# Patient Record
Sex: FEMALE | Employment: STUDENT | URBAN - METROPOLITAN AREA
[De-identification: names, ages, dates, MRNs, and addresses within clinical notes are randomized per-mention and may not be internally consistent; named-entity substitution may affect disease eponyms.]

---

## 2023-02-08 PROBLEM — F41.9 ANXIETY: Status: ACTIVE | Noted: 2023-02-08

## 2023-02-08 PROBLEM — N92.6 IRREGULAR MENSES: Status: ACTIVE | Noted: 2023-02-08

## 2023-02-08 PROBLEM — G80.9 CEREBRAL PALSY (MULTI): Status: ACTIVE | Noted: 2023-02-08

## 2023-02-08 PROBLEM — R73.09 ELEVATED GLUCOSE LEVEL: Status: RESOLVED | Noted: 2023-02-08 | Resolved: 2023-02-08

## 2023-02-08 RX ORDER — ESCITALOPRAM OXALATE 5 MG/1
1 TABLET ORAL DAILY
COMMUNITY
Start: 2022-10-31 | End: 2023-08-11 | Stop reason: DRUGHIGH

## 2023-02-08 RX ORDER — NORGESTIMATE AND ETHINYL ESTRADIOL 0.25-0.035
1 KIT ORAL DAILY
COMMUNITY
Start: 2022-10-12 | End: 2023-12-01 | Stop reason: SDUPTHER

## 2023-08-11 ENCOUNTER — OFFICE VISIT (OUTPATIENT)
Dept: PRIMARY CARE | Facility: CLINIC | Age: 18
End: 2023-08-11
Payer: COMMERCIAL

## 2023-08-11 VITALS
BODY MASS INDEX: 34.55 KG/M2 | DIASTOLIC BLOOD PRESSURE: 68 MMHG | SYSTOLIC BLOOD PRESSURE: 110 MMHG | TEMPERATURE: 98.4 F | HEIGHT: 61 IN | WEIGHT: 183 LBS | HEART RATE: 76 BPM | RESPIRATION RATE: 20 BRPM

## 2023-08-11 DIAGNOSIS — G80.9 CEREBRAL PALSY, UNSPECIFIED TYPE (MULTI): ICD-10-CM

## 2023-08-11 DIAGNOSIS — Z13.31 SCREENING FOR DEPRESSION: ICD-10-CM

## 2023-08-11 DIAGNOSIS — F41.9 ANXIETY: Primary | ICD-10-CM

## 2023-08-11 DIAGNOSIS — E66.9 OBESITY (BMI 30.0-34.9): ICD-10-CM

## 2023-08-11 PROBLEM — E66.811 OBESITY (BMI 30.0-34.9): Status: ACTIVE | Noted: 2023-08-11

## 2023-08-11 PROCEDURE — 99214 OFFICE O/P EST MOD 30 MIN: CPT | Performed by: FAMILY MEDICINE

## 2023-08-11 PROCEDURE — 96127 BRIEF EMOTIONAL/BEHAV ASSMT: CPT | Performed by: FAMILY MEDICINE

## 2023-08-11 RX ORDER — AMITRIPTYLINE HYDROCHLORIDE 10 MG/1
10 TABLET, FILM COATED ORAL NIGHTLY
Qty: 30 TABLET | Refills: 11 | Status: SHIPPED | OUTPATIENT
Start: 2023-08-11 | End: 2023-09-05

## 2023-08-11 RX ORDER — ESCITALOPRAM OXALATE 10 MG/1
10 TABLET ORAL DAILY
Qty: 30 TABLET | Refills: 3 | Status: SHIPPED | OUTPATIENT
Start: 2023-08-11 | End: 2024-01-16

## 2023-08-11 ASSESSMENT — ENCOUNTER SYMPTOMS
SLEEP DISTURBANCE: 0
NERVOUS/ANXIOUS: 1
IRRITABILITY: 1
DYSPHORIC MOOD: 0

## 2023-08-11 ASSESSMENT — PATIENT HEALTH QUESTIONNAIRE - PHQ9
1. LITTLE INTEREST OR PLEASURE IN DOING THINGS: NOT AT ALL
SUM OF ALL RESPONSES TO PHQ9 QUESTIONS 1 AND 2: 0
2. FEELING DOWN, DEPRESSED OR HOPELESS: NOT AT ALL

## 2023-08-11 NOTE — PROGRESS NOTES
"Subjective   Patient ID: Pradip Alcocer is a 17 y.o. female who presents for Anxiety.    Pts lexapro not working any longer. Pt is getting angry and on edge more often.    Anxiety  Presents for follow-up visit. Symptoms include irritability and nervous/anxious behavior. Patient reports no suicidal ideas.            Review of Systems   Constitutional:  Positive for irritability.   Psychiatric/Behavioral:  Negative for dysphoric mood, sleep disturbance and suicidal ideas. The patient is nervous/anxious.         On escitalopram, anxiety better controlled but anger is an issue. Pt feels its a problem. Is not currently seeing a therapist.       Objective   /68   Pulse 76   Temp 36.9 °C (98.4 °F)   Resp 20   Ht 1.549 m (5' 1\")   Wt 83 kg (183 lb)   BMI 34.58 kg/m²     Physical Exam  Vitals and nursing note reviewed.   Constitutional:       General: She is not in acute distress.     Appearance: Normal appearance.   HENT:      Head: Normocephalic and atraumatic.   Cardiovascular:      Rate and Rhythm: Normal rate and regular rhythm.      Heart sounds: Normal heart sounds.   Pulmonary:      Effort: Pulmonary effort is normal.      Breath sounds: Normal breath sounds.   Neurological:      Mental Status: She is alert. Mental status is at baseline.   Psychiatric:         Mood and Affect: Mood normal.         Behavior: Behavior normal.         Assessment/Plan   Problem List Items Addressed This Visit       Anxiety - Primary     Pt on escitalopram 10  mg . Having breakthrough symptoms, no side effects.   Will add amitriptyline and have f/up in 3 mo         Relevant Medications    amitriptyline (Elavil) 10 mg tablet    Cerebral palsy (CMS/HCC)     Has some limited muscle movement in right hand.  Has been stable.  Can do all activities          Screening for depression    Obesity (BMI 30.0-34.9)     Advise healthy diet and exercise  Offered nutritional counseling,declined today  Ho printed               "

## 2023-08-11 NOTE — ASSESSMENT & PLAN NOTE
Pt on escitalopram 10  mg . Having breakthrough symptoms, no side effects.   Will add amitriptyline and have f/up in 3 mo

## 2023-08-28 ENCOUNTER — TELEPHONE (OUTPATIENT)
Dept: PRIMARY CARE | Facility: CLINIC | Age: 18
End: 2023-08-28
Payer: COMMERCIAL

## 2023-08-28 NOTE — TELEPHONE ENCOUNTER
Mom called and said Pradip was on Escitalopram 10mg and it was working for her at first but it did stop working. She was seen and wanted dose increased but was given Amitriptyline 10mg and that is not working for her at all. Mom said she is very irritable and anxiety has increased. Mom would like to know if she can try something different or go back on the Escitalopram with an increased dose?     Shira Reyes [phone]

## 2023-09-03 DIAGNOSIS — F41.9 ANXIETY: ICD-10-CM

## 2023-09-05 RX ORDER — AMITRIPTYLINE HYDROCHLORIDE 10 MG/1
10 TABLET, FILM COATED ORAL NIGHTLY
Qty: 90 TABLET | Refills: 3 | Status: SHIPPED | OUTPATIENT
Start: 2023-09-05 | End: 2024-09-04

## 2023-10-16 ENCOUNTER — TELEPHONE (OUTPATIENT)
Dept: PRIMARY CARE | Facility: CLINIC | Age: 18
End: 2023-10-16
Payer: COMMERCIAL

## 2023-10-16 NOTE — TELEPHONE ENCOUNTER
Mom, Meghan [phone] called and is concerned the medication child is taking is no longer working and has questions. Please call her. Thanks

## 2023-11-15 ENCOUNTER — APPOINTMENT (OUTPATIENT)
Dept: PRIMARY CARE | Facility: CLINIC | Age: 18
End: 2023-11-15
Payer: COMMERCIAL

## 2023-12-01 ENCOUNTER — OFFICE VISIT (OUTPATIENT)
Dept: PRIMARY CARE | Facility: CLINIC | Age: 18
End: 2023-12-01
Payer: COMMERCIAL

## 2023-12-01 VITALS
BODY MASS INDEX: 34.93 KG/M2 | SYSTOLIC BLOOD PRESSURE: 102 MMHG | DIASTOLIC BLOOD PRESSURE: 64 MMHG | HEIGHT: 61 IN | TEMPERATURE: 99 F | WEIGHT: 185 LBS | RESPIRATION RATE: 20 BRPM | HEART RATE: 80 BPM

## 2023-12-01 DIAGNOSIS — F41.9 ANXIETY: Primary | ICD-10-CM

## 2023-12-01 DIAGNOSIS — Z30.41 ENCOUNTER FOR SURVEILLANCE OF CONTRACEPTIVE PILLS: ICD-10-CM

## 2023-12-01 PROCEDURE — 99214 OFFICE O/P EST MOD 30 MIN: CPT | Performed by: FAMILY MEDICINE

## 2023-12-01 PROCEDURE — 1036F TOBACCO NON-USER: CPT | Performed by: FAMILY MEDICINE

## 2023-12-01 RX ORDER — NORGESTIMATE AND ETHINYL ESTRADIOL 0.25-0.035
1 KIT ORAL DAILY
Qty: 28 TABLET | Refills: 12 | Status: SHIPPED | OUTPATIENT
Start: 2023-12-01

## 2023-12-01 ASSESSMENT — ENCOUNTER SYMPTOMS
HEADACHES: 0
DIFFICULTY URINATING: 0
DIARRHEA: 0
NAUSEA: 0
SLEEP DISTURBANCE: 0
SHORTNESS OF BREATH: 0
NUMBNESS: 0
FATIGUE: 0
DYSPHORIC MOOD: 0
WEAKNESS: 0
NERVOUS/ANXIOUS: 1
VOMITING: 0

## 2023-12-01 NOTE — PROGRESS NOTES
"Subjective   Patient ID: Pradip Alcocer is a 18 y.o. female who presents for Follow-up (Med check for Lexapro. Pt says its working well for her, no side effects. ).    HPI     Review of Systems   Constitutional:  Negative for fatigue.   Respiratory:  Negative for shortness of breath.    Cardiovascular:  Negative for chest pain.   Gastrointestinal:  Negative for diarrhea, nausea and vomiting.   Genitourinary:  Negative for difficulty urinating, vaginal bleeding and vaginal discharge.        Needs rf ocp   Neurological:  Negative for weakness, numbness and headaches.   Psychiatric/Behavioral:  Negative for dysphoric mood, sleep disturbance and suicidal ideas. The patient is nervous/anxious.         Pt on escitalopram and amitriptyline, works well and is stable       Objective   /64   Pulse 80   Temp 37.2 °C (99 °F)   Resp 20   Ht 1.549 m (5' 1\")   Wt 83.9 kg (185 lb)   BMI 34.96 kg/m²     Physical Exam  Vitals and nursing note reviewed.   Constitutional:       General: She is not in acute distress.  Cardiovascular:      Rate and Rhythm: Normal rate and regular rhythm.      Heart sounds: Normal heart sounds.   Pulmonary:      Effort: Pulmonary effort is normal.      Breath sounds: Normal breath sounds.   Neurological:      Mental Status: She is alert. Mental status is at baseline.   Psychiatric:         Mood and Affect: Mood normal.         Behavior: Behavior normal.         Assessment/Plan   Problem List Items Addressed This Visit             ICD-10-CM    Anxiety - Primary F41.9     Pt on escitalopram and amitriptyline, quyen meds well, has been stable  No rf needed at this time  F/up 6 mo          Other Visit Diagnoses         Codes    Encounter for surveillance of contraceptive pills     Z30.41    Relevant Medications    norgestimate-ethinyl estradioL (Sprintec, 28,) 0.25-35 mg-mcg tablet               "

## 2023-12-01 NOTE — ASSESSMENT & PLAN NOTE
Pt on escitalopram and amitriptyline, quyen meds well, has been stable  No rf needed at this time  F/up 6 mo

## 2024-01-15 DIAGNOSIS — F41.9 ANXIETY: Primary | ICD-10-CM

## 2024-01-16 RX ORDER — ESCITALOPRAM OXALATE 10 MG/1
10 TABLET ORAL DAILY
Qty: 90 TABLET | Refills: 3 | Status: SHIPPED | OUTPATIENT
Start: 2024-01-16

## 2024-02-21 ENCOUNTER — OFFICE VISIT (OUTPATIENT)
Dept: DERMATOLOGY | Facility: CLINIC | Age: 19
End: 2024-02-21
Payer: COMMERCIAL

## 2024-02-21 DIAGNOSIS — D22.9 NEVUS: Primary | ICD-10-CM

## 2024-02-21 PROCEDURE — 1036F TOBACCO NON-USER: CPT | Performed by: NURSE PRACTITIONER

## 2024-02-21 PROCEDURE — 99203 OFFICE O/P NEW LOW 30 MIN: CPT | Performed by: NURSE PRACTITIONER

## 2024-02-21 NOTE — PROGRESS NOTES
Subjective     Pradip Alcocer is a 18 y.o. female who presents for the following: single lesion.    New patient in for single lesion to left cheek present for year and has become more raised over time.      Review of Systems:  No other skin or systemic complaints other than what is documented elsewhere in the note.    The following portions of the chart were reviewed this encounter and updated as appropriate:       Skin Cancer History  No skin cancer on file.    Specialty Problems    None    Past Medical History:  Pradip Alcocer  has a past medical history of Elevated glucose level (02/08/2023) and Prematurity (11/06/2013).    Past Surgical History:  Pradip Alcocer  has a past surgical history that includes Other surgical history (04/27/2021).    Family History:  Patient family history is not on file.    Social History:  Pradip Alcocer  reports that she has never smoked. She has never used smokeless tobacco. No history on file for alcohol use and drug use.    Allergies:  Patient has no known allergies.    Current Medications / CAM's:    Current Outpatient Medications:     amitriptyline (Elavil) 10 mg tablet, TAKE 1 TABLET (10 MG) BY MOUTH ONCE DAILY AT BEDTIME., Disp: 90 tablet, Rfl: 3    escitalopram (Lexapro) 10 mg tablet, TAKE 1 TABLET BY MOUTH EVERY DAY, Disp: 90 tablet, Rfl: 3    norgestimate-ethinyl estradioL (Sprintec, 28,) 0.25-35 mg-mcg tablet, Take 1 tablet by mouth once daily., Disp: 28 tablet, Rfl: 12     Objective   Well appearing patient in no apparent distress; mood and affect are within normal limits.        Assessment/Plan   1. Nevus  Left Buccal Cheek  Multiple benign appearing pigmented macule without any concerning features of skin cancer.    Plan: Counseling.  I counseled the patient regarding the following:  Instructions: Monthly self-skin checks to monitor for any changes in moles are recommended. Expectations: Benign Nevi are pigmented nests of cells within the skin.No treatment is necessary.  Contact Office if: Any moles change in size, shape or color; itch, bum or bleed.

## 2024-02-23 ENCOUNTER — OFFICE VISIT (OUTPATIENT)
Dept: PRIMARY CARE | Facility: CLINIC | Age: 19
End: 2024-02-23
Payer: COMMERCIAL

## 2024-02-23 VITALS
RESPIRATION RATE: 20 BRPM | WEIGHT: 194 LBS | SYSTOLIC BLOOD PRESSURE: 112 MMHG | BODY MASS INDEX: 36.63 KG/M2 | HEART RATE: 80 BPM | TEMPERATURE: 97.3 F | HEIGHT: 61 IN | DIASTOLIC BLOOD PRESSURE: 80 MMHG

## 2024-02-23 DIAGNOSIS — F41.9 ANXIETY: Primary | ICD-10-CM

## 2024-02-23 DIAGNOSIS — Z13.31 SCREENING FOR DEPRESSION: ICD-10-CM

## 2024-02-23 DIAGNOSIS — E66.9 OBESITY (BMI 30-39.9): ICD-10-CM

## 2024-02-23 DIAGNOSIS — G80.9 CEREBRAL PALSY, UNSPECIFIED TYPE (MULTI): ICD-10-CM

## 2024-02-23 PROCEDURE — 99214 OFFICE O/P EST MOD 30 MIN: CPT | Performed by: FAMILY MEDICINE

## 2024-02-23 PROCEDURE — 96127 BRIEF EMOTIONAL/BEHAV ASSMT: CPT | Performed by: FAMILY MEDICINE

## 2024-02-23 ASSESSMENT — ENCOUNTER SYMPTOMS
SEIZURES: 0
FATIGUE: 1
NERVOUS/ANXIOUS: 1
CARDIOVASCULAR NEGATIVE: 1
SLEEP DISTURBANCE: 1
DYSPHORIC MOOD: 0
RESPIRATORY NEGATIVE: 1
GASTROINTESTINAL NEGATIVE: 1
DIFFICULTY URINATING: 0

## 2024-02-23 NOTE — ASSESSMENT & PLAN NOTE
Pt on escitalopram 10 mg daily, will increase to 20 mg daily  Cont amitriptyline at 10 mg at night and will determine if this needs to be increased at next visit  F/up 1 month

## 2024-02-23 NOTE — PROGRESS NOTES
"Subjective   Patient ID: Pradip Alcocer is a 18 y.o. female who presents for Follow-up (Pt says Amitriptyline and lexapro not working. Says it works for few hours and then wears off. ).    HPI     Review of Systems   Constitutional:  Positive for fatigue.   HENT: Negative.     Respiratory: Negative.     Cardiovascular: Negative.    Gastrointestinal: Negative.    Genitourinary:  Negative for difficulty urinating.   Neurological:  Negative for seizures and syncope.   Psychiatric/Behavioral:  Positive for sleep disturbance. Negative for dysphoric mood and suicidal ideas. The patient is nervous/anxious.         Pt with anxiety on escitalopram and amitriptyline  Doesn't work all day. No side effects. Will adjust dose       Objective   /80   Pulse 80   Temp 36.3 °C (97.3 °F)   Resp 20   Ht 1.549 m (5' 1\")   Wt 88 kg (194 lb)   BMI 36.66 kg/m²     Physical Exam  Vitals and nursing note reviewed.   Constitutional:       General: She is not in acute distress.     Appearance: Normal appearance.   HENT:      Head: Normocephalic and atraumatic.   Cardiovascular:      Rate and Rhythm: Normal rate and regular rhythm.      Heart sounds: Normal heart sounds.   Pulmonary:      Effort: Pulmonary effort is normal.      Breath sounds: Normal breath sounds.   Skin:     General: Skin is warm and dry.   Neurological:      Mental Status: She is alert. Mental status is at baseline.   Psychiatric:         Mood and Affect: Mood normal.         Behavior: Behavior normal.         Assessment/Plan   Problem List Items Addressed This Visit             ICD-10-CM    Anxiety - Primary F41.9     Pt on escitalopram 10 mg daily, will increase to 20 mg daily  Cont amitriptyline at 10 mg at night and will determine if this needs to be increased at next visit  F/up 1 month         Relevant Orders    Follow Up [facility] - PCP - Established    Cerebral palsy (CMS/HCC) G80.9    Screening for depression Z13.31    Obesity (BMI " 30-39.9) E66.9     Advise healthy diet and exercise  Offered nutritional counseling, pt declined  Ho printed

## 2024-03-22 ENCOUNTER — APPOINTMENT (OUTPATIENT)
Dept: PRIMARY CARE | Facility: CLINIC | Age: 19
End: 2024-03-22
Payer: COMMERCIAL

## 2024-05-31 ENCOUNTER — APPOINTMENT (OUTPATIENT)
Dept: PRIMARY CARE | Facility: CLINIC | Age: 19
End: 2024-05-31
Payer: COMMERCIAL

## 2024-06-21 ENCOUNTER — APPOINTMENT (OUTPATIENT)
Dept: PRIMARY CARE | Facility: CLINIC | Age: 19
End: 2024-06-21
Payer: COMMERCIAL

## 2024-07-12 ENCOUNTER — APPOINTMENT (OUTPATIENT)
Dept: PRIMARY CARE | Facility: CLINIC | Age: 19
End: 2024-07-12
Payer: COMMERCIAL

## 2024-07-12 VITALS
WEIGHT: 197 LBS | BODY MASS INDEX: 37.22 KG/M2 | DIASTOLIC BLOOD PRESSURE: 70 MMHG | TEMPERATURE: 97.9 F | RESPIRATION RATE: 20 BRPM | HEART RATE: 80 BPM | SYSTOLIC BLOOD PRESSURE: 118 MMHG

## 2024-07-12 DIAGNOSIS — F41.9 ANXIETY: Primary | ICD-10-CM

## 2024-07-12 PROBLEM — Z78.9 USES BIRTH CONTROL: Status: ACTIVE | Noted: 2024-07-12

## 2024-07-12 PROCEDURE — 99214 OFFICE O/P EST MOD 30 MIN: CPT | Performed by: FAMILY MEDICINE

## 2024-07-12 RX ORDER — AMITRIPTYLINE HYDROCHLORIDE 25 MG/1
25 TABLET, FILM COATED ORAL NIGHTLY
Qty: 30 TABLET | Refills: 3 | Status: SHIPPED | OUTPATIENT
Start: 2024-07-12 | End: 2025-07-12

## 2024-07-12 ASSESSMENT — ENCOUNTER SYMPTOMS
RESPIRATORY NEGATIVE: 1
DYSPHORIC MOOD: 0
DIFFICULTY URINATING: 0
LIGHT-HEADEDNESS: 0
GASTROINTESTINAL NEGATIVE: 1
DIZZINESS: 0
SLEEP DISTURBANCE: 0
NERVOUS/ANXIOUS: 1
FATIGUE: 0

## 2024-07-12 NOTE — PROGRESS NOTES
Subjective   Patient ID: Pradip Alcocer is a 18 y.o. female who presents for Follow-up (Lexapro follow up. Pt says it worked at first but not anymore. She is angry all the time. ).    HPI     Review of Systems   Constitutional:  Negative for fatigue.   Respiratory: Negative.     Gastrointestinal: Negative.    Genitourinary:  Negative for difficulty urinating.   Neurological:  Negative for dizziness and light-headedness.   Psychiatric/Behavioral:  Negative for dysphoric mood, sleep disturbance and suicidal ideas. The patient is nervous/anxious.         On escitalopram and amitriptyline , initially worked well   No side effects  Gets angry more often over last few months, no changes in lifestyle       Objective   /70   Pulse 80   Temp 36.6 °C (97.9 °F)   Resp 20   Wt 89.4 kg (197 lb)   BMI 37.22 kg/m²     Physical Exam  Vitals and nursing note reviewed.   Constitutional:       General: She is not in acute distress.     Appearance: Normal appearance.   HENT:      Head: Normocephalic and atraumatic.   Cardiovascular:      Rate and Rhythm: Normal rate and regular rhythm.      Heart sounds: Normal heart sounds.   Pulmonary:      Effort: Pulmonary effort is normal.      Breath sounds: Normal breath sounds.   Skin:     General: Skin is warm and dry.   Neurological:      General: No focal deficit present.      Mental Status: She is alert.   Psychiatric:         Mood and Affect: Mood normal.         Behavior: Behavior normal.         Assessment/Plan   Problem List Items Addressed This Visit             ICD-10-CM    Anxiety - Primary F41.9     Pt on esitalopram 20 mg , amitriptyline 10 mg   Will increase amitriptyline to 25 mg at night   Pt to start counseling, referred to psych  F/up 12 wk         Relevant Medications    amitriptyline (Elavil) 25 mg tablet    Other Relevant Orders    Referral to Psychology    Follow Up [facility] - PCP - Established

## 2024-07-12 NOTE — ASSESSMENT & PLAN NOTE
Pt on esitalopram 20 mg , amitriptyline 10 mg   Will increase amitriptyline to 25 mg at night   Pt to start counseling, referred to psych  F/up 12 wk

## 2024-08-07 ENCOUNTER — APPOINTMENT (OUTPATIENT)
Dept: BEHAVIORAL HEALTH | Facility: CLINIC | Age: 19
End: 2024-08-07
Payer: COMMERCIAL

## 2024-08-16 DIAGNOSIS — F41.9 ANXIETY: ICD-10-CM

## 2024-08-17 RX ORDER — AMITRIPTYLINE HYDROCHLORIDE 25 MG/1
25 TABLET, FILM COATED ORAL NIGHTLY
Qty: 90 TABLET | Refills: 2 | Status: SHIPPED | OUTPATIENT
Start: 2024-08-17 | End: 2025-08-17

## 2024-10-04 ENCOUNTER — APPOINTMENT (OUTPATIENT)
Dept: PRIMARY CARE | Facility: CLINIC | Age: 19
End: 2024-10-04
Payer: COMMERCIAL

## 2024-10-04 VITALS
DIASTOLIC BLOOD PRESSURE: 72 MMHG | HEART RATE: 88 BPM | HEIGHT: 61 IN | RESPIRATION RATE: 20 BRPM | TEMPERATURE: 98.1 F | BODY MASS INDEX: 37.76 KG/M2 | WEIGHT: 200 LBS | SYSTOLIC BLOOD PRESSURE: 110 MMHG

## 2024-10-04 DIAGNOSIS — Z23 NEED FOR VACCINATION: ICD-10-CM

## 2024-10-04 DIAGNOSIS — F41.9 ANXIETY: ICD-10-CM

## 2024-10-04 PROCEDURE — 90656 IIV3 VACC NO PRSV 0.5 ML IM: CPT | Performed by: FAMILY MEDICINE

## 2024-10-04 PROCEDURE — 90471 IMMUNIZATION ADMIN: CPT | Performed by: FAMILY MEDICINE

## 2024-10-04 PROCEDURE — 99214 OFFICE O/P EST MOD 30 MIN: CPT | Performed by: FAMILY MEDICINE

## 2024-10-04 RX ORDER — ESCITALOPRAM OXALATE 20 MG/1
20 TABLET ORAL DAILY
Qty: 30 TABLET | Refills: 5 | Status: SHIPPED | OUTPATIENT
Start: 2024-10-04 | End: 2025-04-02

## 2024-10-04 ASSESSMENT — ENCOUNTER SYMPTOMS
NERVOUS/ANXIOUS: 1
SHORTNESS OF BREATH: 0
DYSPHORIC MOOD: 0
GASTROINTESTINAL NEGATIVE: 1
WHEEZING: 0
DIFFICULTY URINATING: 0
FATIGUE: 0

## 2024-10-04 NOTE — PROGRESS NOTES
"Subjective   Patient ID: Pradip Alcocer is a 18 y.o. female who presents for Follow-up (3 month med check for Lexapro. Pt would like an increase in dose.).    HPI     Review of Systems   Constitutional:  Negative for fatigue.   Respiratory:  Negative for shortness of breath and wheezing.    Cardiovascular:  Negative for chest pain.   Gastrointestinal: Negative.    Genitourinary:  Negative for difficulty urinating.   Psychiatric/Behavioral:  Negative for dysphoric mood and suicidal ideas. The patient is nervous/anxious.         Pt on escitalopram and amitriptylne, meds well tolerated  Pt has breakthrough anxiety, sleeps well at night  Would like to increase escitalopram       Objective   /72   Pulse 88   Temp 36.7 °C (98.1 °F)   Resp 20   Ht 1.549 m (5' 1\")   Wt 90.7 kg (200 lb)   BMI 37.79 kg/m²     Physical Exam  Vitals and nursing note reviewed.   Constitutional:       General: She is not in acute distress.     Appearance: Normal appearance.   HENT:      Head: Normocephalic and atraumatic.   Cardiovascular:      Rate and Rhythm: Normal rate and regular rhythm.      Heart sounds: Normal heart sounds.   Pulmonary:      Effort: Pulmonary effort is normal.      Breath sounds: Normal breath sounds.   Skin:     General: Skin is warm and dry.   Neurological:      Mental Status: She is alert.   Psychiatric:         Mood and Affect: Mood normal.         Behavior: Behavior normal.         Assessment/Plan   Problem List Items Addressed This Visit             ICD-10-CM    Anxiety F41.9     Pt on escitalopram and amitriptyline, meds wel tolerated  Will increase escitalopram to 20 mg/d  F/up 3 mo           Relevant Medications    escitalopram (Lexapro) 20 mg tablet    Other Relevant Orders    Follow Up [facility] - PCP - Established     Other Visit Diagnoses         Codes    Need for vaccination     Z23    Relevant Orders    Flu vaccine, trivalent, preservative free, age 6 months and greater " (Fluraix/Fluzone/Flulaval)

## 2024-10-04 NOTE — ASSESSMENT & PLAN NOTE
Pt on escitalopram and amitriptyline, meds wel tolerated  Will increase escitalopram to 20 mg/d  F/up 3 mo

## 2024-11-08 DIAGNOSIS — F41.9 ANXIETY: ICD-10-CM

## 2024-11-11 RX ORDER — ESCITALOPRAM OXALATE 20 MG/1
20 TABLET ORAL DAILY
Qty: 90 TABLET | Refills: 2 | Status: SHIPPED | OUTPATIENT
Start: 2024-11-11

## 2024-12-20 DIAGNOSIS — Z30.41 ENCOUNTER FOR SURVEILLANCE OF CONTRACEPTIVE PILLS: ICD-10-CM

## 2024-12-26 RX ORDER — NORGESTIMATE AND ETHINYL ESTRADIOL 0.25-0.035
1 KIT ORAL DAILY
Qty: 84 TABLET | Refills: 4 | Status: SHIPPED | OUTPATIENT
Start: 2024-12-26

## 2025-01-10 ENCOUNTER — APPOINTMENT (OUTPATIENT)
Dept: PRIMARY CARE | Facility: CLINIC | Age: 20
End: 2025-01-10
Payer: COMMERCIAL

## 2025-01-10 VITALS
HEART RATE: 92 BPM | WEIGHT: 207 LBS | SYSTOLIC BLOOD PRESSURE: 112 MMHG | TEMPERATURE: 97.7 F | HEIGHT: 61 IN | BODY MASS INDEX: 39.08 KG/M2 | DIASTOLIC BLOOD PRESSURE: 80 MMHG | RESPIRATION RATE: 20 BRPM

## 2025-01-10 DIAGNOSIS — Z13.31 SCREENING FOR DEPRESSION: ICD-10-CM

## 2025-01-10 DIAGNOSIS — Z00.00 HEALTH CARE MAINTENANCE: ICD-10-CM

## 2025-01-10 DIAGNOSIS — F41.9 ANXIETY: Primary | ICD-10-CM

## 2025-01-10 DIAGNOSIS — E66.812 CLASS 2 OBESITY DUE TO EXCESS CALORIES WITHOUT SERIOUS COMORBIDITY WITH BODY MASS INDEX (BMI) OF 39.0 TO 39.9 IN ADULT: ICD-10-CM

## 2025-01-10 DIAGNOSIS — E66.09 CLASS 2 OBESITY DUE TO EXCESS CALORIES WITHOUT SERIOUS COMORBIDITY WITH BODY MASS INDEX (BMI) OF 39.0 TO 39.9 IN ADULT: ICD-10-CM

## 2025-01-10 PROCEDURE — 99214 OFFICE O/P EST MOD 30 MIN: CPT | Performed by: FAMILY MEDICINE

## 2025-01-10 PROCEDURE — 96127 BRIEF EMOTIONAL/BEHAV ASSMT: CPT | Performed by: FAMILY MEDICINE

## 2025-01-10 ASSESSMENT — PATIENT HEALTH QUESTIONNAIRE - PHQ9
SUM OF ALL RESPONSES TO PHQ9 QUESTIONS 1 & 2: 0
1. LITTLE INTEREST OR PLEASURE IN DOING THINGS: NOT AT ALL
2. FEELING DOWN, DEPRESSED OR HOPELESS: NOT AT ALL

## 2025-01-10 ASSESSMENT — ENCOUNTER SYMPTOMS
DIFFICULTY URINATING: 0
DIARRHEA: 0
FATIGUE: 0
NERVOUS/ANXIOUS: 1
STRIDOR: 0
NAUSEA: 0
VOMITING: 0
DYSPHORIC MOOD: 0
COUGH: 0
WHEEZING: 0

## 2025-01-10 NOTE — ASSESSMENT & PLAN NOTE
No Pt on escitalopram fernando amitriptyline  No side effects on meds  No worsening symptoms   Pt would like referral to psych to manage meds

## 2025-01-10 NOTE — PROGRESS NOTES
"Subjective   Patient ID: Pradip Alcocer is a 19 y.o. female who presents for Follow-up (3 month anxiety follow up. Medications working ok, but not as well as she was hoping. She would like ref to psych. ).    HPI     Review of Systems   Constitutional:  Negative for fatigue.   Respiratory:  Negative for cough, wheezing and stridor.    Cardiovascular:  Negative for chest pain.   Gastrointestinal:  Negative for diarrhea, nausea and vomiting.   Genitourinary:  Negative for difficulty urinating.   Psychiatric/Behavioral:  Negative for dysphoric mood and suicidal ideas. The patient is nervous/anxious.         Pt on escitalopram and amitriptyline  Pt would like to see psych       Objective   /80   Pulse 92   Temp 36.5 °C (97.7 °F)   Resp 20   Ht 1.549 m (5' 1\")   Wt 93.9 kg (207 lb)   BMI 39.11 kg/m²     Physical Exam  Vitals and nursing note reviewed.   Constitutional:       General: She is not in acute distress.     Appearance: Normal appearance.   HENT:      Head: Normocephalic and atraumatic.   Cardiovascular:      Rate and Rhythm: Normal rate and regular rhythm.      Heart sounds: Normal heart sounds.   Pulmonary:      Effort: Pulmonary effort is normal.      Breath sounds: Normal breath sounds.   Skin:     General: Skin is warm and dry.   Neurological:      General: No focal deficit present.      Mental Status: She is alert.   Psychiatric:         Mood and Affect: Mood normal.         Behavior: Behavior normal.         Assessment/Plan   Problem List Items Addressed This Visit             ICD-10-CM    Anxiety - Primary F41.9     Pt on escitalopram fernando amitriptyline  No side effects on meds  No worsening symptoms   Pt would like referral to psych to manage meds         Relevant Orders    Referral to Psychiatry    Screening for depression Z13.31    Class 2 obesity due to excess calories with body mass index (BMI) of 39.0 to 39.9 in adult E66.812, E66.09, Z68.39     Advise healthy diet and exerccise  HO " printed  Referred to nutritional serv         Relevant Orders    Referral to Nutrition Services     Other Visit Diagnoses         Codes    Health care maintenance     Z00.00    Relevant Orders    Follow Up In [facility] - PCP - Health Maintenance

## 2025-02-20 ENCOUNTER — APPOINTMENT (OUTPATIENT)
Dept: BEHAVIORAL HEALTH | Facility: CLINIC | Age: 20
End: 2025-02-20
Payer: COMMERCIAL

## 2025-02-20 DIAGNOSIS — F41.9 ANXIETY: Primary | ICD-10-CM

## 2025-02-20 PROCEDURE — 99205 OFFICE O/P NEW HI 60 MIN: CPT | Performed by: NURSE PRACTITIONER

## 2025-02-20 RX ORDER — AMITRIPTYLINE HYDROCHLORIDE 50 MG/1
TABLET, FILM COATED ORAL
Qty: 60 TABLET | Refills: 1 | Status: SHIPPED | OUTPATIENT
Start: 2025-02-20

## 2025-02-20 RX ORDER — ESCITALOPRAM OXALATE 5 MG/1
TABLET ORAL
Qty: 4 TABLET | Refills: 0 | Status: SHIPPED | OUTPATIENT
Start: 2025-02-20

## 2025-02-20 NOTE — PROGRESS NOTES
"Pradip presents to virtual appt today. She consents to treatment.     Chief Complaint: \"When I take a double dose of both the escitalopram and the amitriptyline it use to work for my anxiety, but it doesn't anymore\".     History of Present Illness:     Pradip is a 18 y/o CF with a history of anxiety (Aug. 2023), currently prescribed Lexapro 20 mg and amitriptyline 25 mg (taking two tablets of both medications, managed by PCP (Dr. Serena Pro. Pradip also has medical history of cerebral palsy (with limited muscle in right hand\". Pradip works at [employer], has been employed for 16 months, works FT. She resides with parents, 18 y/o brother and 3 dogs.     Pradip reports that when on her medication and \"When it's working, I have a chill personality and if not, I can be snippy, which leads to the rages\". For fun, she enjoys playing video games on [employer], shooter game/obstacle game\". Pradip reports that she hangs out with friends outside of work. Pradip reports that she has \"severe angry issues and when I take a double dose, it helps keep my emotions in check\". Pradip reports that when she is upset, \"It's a blind rage and when it's really, really bad, I get physical because I can't control what I'm doing\". Reports having angry issues all her life, \"For as long as I can remember\". Pradip reports that her brother and father are triggers, \"But they can literally do nothing and I just snap\". Pradip reports that rages are rare since taking double doses, \"They are off/on\". She has been taking double medication for approximately 6 months. Pradip reports recently she had thoughts of dying and \"I had a panic attack, which \"was like a big trigger\". Pradip reports that this was two months. Denies plan/intent, \"but I was thinking about how I treat my family members\". Feels she may not be depressed, but anxiety caused her to have SI.     MEDICAL HISTORY:  -Drug/Food allergies: Denies   -Past/current medical problems:  asthma, " "trauma, seizures, heart conditions, DM, cancer   -Past hospitalizations/surgeries: Denies   -Broken bones: Denies  -PCP/Last Visit: Serena Pro    Medical medications:   Family Hx of Medical conditions:   Denies family history of cardiac disease, MGM with prostate CA, denies history of HTN believes there are family members with DM    PAST PSYCHIATRIC HISTORY:  -Prior Diagnosis: anxiety   -Current Outpatient Psychiatrist: Denies   -Date of last appt with psychiatrist: Denies   -Current Therapist/Counselor: Denies   -Past/Current Mental Health Agency: Only saw a therapist for a week, which was a couple of weeks ago  - and last seen: Denies    -Inpatient treatment history: Denies   -Residential/PHP/IOP treatment history: Denies  -Prior psychiatric medications: Denies   -Prior suicide attempts: Denies   -Self-harm/self-injurious behaviors: Denies     SUBSTANCE ABUSE HISTORY:   -denies use of tobacco products, caffeine, alcohol, and any other illicit substance     FAMILY PSYCHIATRIC HISTORY:  -Psychiatric disorders: Mom and brother with ADHD  -Substance Use: Denies   -Suicide: Denies    SOCIAL HISTORY:  -Family relationships: \"When on my medication, we get along fairly well\"  -Lutheran: Denies   -Gender identity/sexual orientation/sexual activity: Identifies as heterosexual, not currently sexually active, prescribed BC, but has not taken in a couple of months. Regulates menstrual cycle    -LEGAL HISTORY:   Denies hx of legal charges, probation, diversion program, nor senior living/[facility]way [facility].    -ABUSE HISTORY:  Denies     Mental Status Exam  General: Appropriately groomed   Appearance: appears stated age, reddish hair, pulled back in a ponytail, left nostril piercing  Attitude: Calm, cooperative   Behavior: Appropriate eye contact.   Motor Activity: No agitation or retardation. No EPS/TD. Normal gait and station.   Speech: Regular rate, rhythm, volume and tone, spontaneous, fluent.   Mood: anxious  Affect: " "Appropriate with full range   Thought Process: Organized, linear, goal directed. Associations are logical.   Thought Content: Does not endorse suicidal or homicidal ideation, no delusions elicited.   Thought Perception: Does not endorse auditory or visual hallucinations, does not appear to be responding to hallucinatory stimuli.   Cognition: Alert, oriented x3. No deficits noted. Adequate fund of knowledge. No deficit in recent and remote memory. No deficits in attention, concentration or language.    Insight: Good, as patient recognizes symptoms of illness and need for recommended treatments.   Judgment: Can make reasonable decisions about ordinary activities of daily living and necessary medical care recommendations.     Review of Systems  As noted in HPI   All other systems have been reviewed and are negative for complaint.      Constitutional: as noted in HPI.   Eyes: as noted in HPI.   ENT: no dental problems.   Cardiovascular: no chest pain.   Gastrointestinal: no constipation.   Genitourinary: Last menses Jan 19th,   Musculoskeletal: cerebral palsy   Neurological: no headache and no tics or twitches.   All other systems have been reviewed and are negative for complaint.     Provider Impression:   Pradip is a 18 y/o young lady, who presents to initial psychiatric appt today, virtually. Pradip has a history of anxiety (Aug. 2023), currently prescribed Lexapro 20 mg and amitriptyline 25 mg (taking two tablets of both medications, managed by PCP (Dr. Serena Pro. Pradip also has medical history of cerebral palsy (with limited muscle in right hand\". Pradip reports anxiety is currently unmanaged, regardless of taking Lexapro 40 mg and Amitriptyline 50, for approximately 6 weeks. She also reports becoming so angry, she has \"blind rages and I become physical\". She reports that two months ago, she had passive SI, denies plan/intent. Outside of having passive SI, reports if she feels down, it is mostly due to anxiety, " recognizing how she treats her family. Currently does not meet criteria for depression. Based on clinical assessment, will discontinue Lexapro and trial increase in amitriptyline to target anxiety. If necessary, if amitriptyline alone unable to manage anxiety, then will consider trialing another SSRI (Sertraline).        SI/HI ASSESSMENT  -Risk Assessment: Pradip Alcocer is currently a low acute risk of suicide and self-harm due to no past suicide attempt(s) and not currently endorsing thoughts of suicide. Pradip Alcocer is currently a low acute risk of violence and harm to others despite past history of violence and not currently threatening others.  -Suicidal Risk Factors: age <19 years and >65 years and current psychiatric illness  -Violence Risk Factors: past history of violence  -Protective Factors: sense of responsibility towards family, positive family relationships, and employment  -Plan to Reduce Risk: Establish medication regimen, outpatient follow-up care, and increase coping skills .     Patient Discussion    DX:   Anxiety     PLAN:   Reviewed OARRS on 2/20/2025 by Tori Savage -OARRS has been reviewed.  DISCONTINUE Lexapro 20 mg x2 tabs, take 10 mg by mouth x3 days and Lexapro 5 mg, by mouth for 4 days and STOP #4 RF 0  DISCONTINUE amitriptyline 25 mg x 2 tabs  INITIATE amitriptyline 75 mg in the am and 25 mg at bedtime #60 RF 1  CONSIDERING COUNSELING   Pradip in agreement with treatment.   Risks/benefits/assessment of medication interventions discussed with pt; pt agreeable to plan. Will continue to monitor for symptoms mgmt and SEs and adjust plan as needed.  MI to increase coping skills/behavior regulation.  Safety plan reviewed.  Call  Psychiatry at [phone] with issues.  For [facility] residents, [employer] is a 24/7 hotline you can call for assistance at [phone]) [phone]. Please call 911 or go to your closest Emergency Room if you feel worse. This includes thoughts of hurting  yourself or anyone else, or having other troubles such as hearing voices, seeing visions, or having new and scary thoughts about the people around you.   Message me on Given.toT with questions/concerns  F/U in 3-4 weeks or sooner if needed.

## 2025-03-27 DIAGNOSIS — F41.9 ANXIETY: ICD-10-CM

## 2025-03-27 RX ORDER — AMITRIPTYLINE HYDROCHLORIDE 50 MG/1
TABLET, FILM COATED ORAL
Qty: 180 TABLET | Refills: 0 | Status: SHIPPED | OUTPATIENT
Start: 2025-03-27

## 2025-07-05 DIAGNOSIS — F41.9 ANXIETY: ICD-10-CM

## 2025-07-07 RX ORDER — AMITRIPTYLINE HYDROCHLORIDE 50 MG/1
TABLET, FILM COATED ORAL
Qty: 180 TABLET | Refills: 0 | Status: SHIPPED | OUTPATIENT
Start: 2025-07-07

## 2025-08-30 ENCOUNTER — OFFICE VISIT (OUTPATIENT)
Age: 20
End: 2025-08-30
Payer: COMMERCIAL

## 2025-08-30 VITALS
SYSTOLIC BLOOD PRESSURE: 120 MMHG | OXYGEN SATURATION: 99 % | RESPIRATION RATE: 18 BRPM | TEMPERATURE: 98.3 F | HEIGHT: 61 IN | DIASTOLIC BLOOD PRESSURE: 87 MMHG | WEIGHT: 207 LBS | HEART RATE: 98 BPM | BODY MASS INDEX: 39.08 KG/M2

## 2025-08-30 DIAGNOSIS — N30.00 ACUTE CYSTITIS WITHOUT HEMATURIA: Primary | ICD-10-CM

## 2025-08-30 LAB
POC APPEARANCE, URINE: ABNORMAL
POC BILIRUBIN, URINE: NEGATIVE
POC BLOOD, URINE: ABNORMAL
POC COLOR, URINE: COLORLESS
POC GLUCOSE, URINE: NEGATIVE MG/DL
POC KETONES, URINE: NEGATIVE MG/DL
POC LEUKOCYTES, URINE: ABNORMAL
POC NITRITE,URINE: NEGATIVE
POC PH, URINE: 6 PH
POC PROTEIN, URINE: NEGATIVE MG/DL
POC SPECIFIC GRAVITY, URINE: 1.01
POC UROBILINOGEN, URINE: 0.2 EU/DL
PREGNANCY TEST URINE, POC: NEGATIVE

## 2025-08-30 RX ORDER — CEPHALEXIN 500 MG/1
500 CAPSULE ORAL 2 TIMES DAILY
Qty: 10 CAPSULE | Refills: 0 | Status: SHIPPED | OUTPATIENT
Start: 2025-08-30 | End: 2025-09-04

## 2025-08-30 RX ORDER — PHENAZOPYRIDINE HYDROCHLORIDE 100 MG/1
100 TABLET, FILM COATED ORAL 3 TIMES DAILY PRN
Qty: 10 TABLET | Refills: 0 | Status: SHIPPED | OUTPATIENT
Start: 2025-08-30 | End: 2025-09-02

## 2025-08-30 ASSESSMENT — ENCOUNTER SYMPTOMS
BACK PAIN: 1
FLANK PAIN: 0
PSYCHIATRIC NEGATIVE: 1
CARDIOVASCULAR NEGATIVE: 1
DYSURIA: 1
EYES NEGATIVE: 1
HEMATURIA: 0
CONSTITUTIONAL NEGATIVE: 1
RESPIRATORY NEGATIVE: 1
FREQUENCY: 1
NEUROLOGICAL NEGATIVE: 1
ALLERGIC/IMMUNOLOGIC NEGATIVE: 1
ENDOCRINE NEGATIVE: 1
ABDOMINAL PAIN: 1
HEMATOLOGIC/LYMPHATIC NEGATIVE: 1